# Patient Record
Sex: MALE | Race: BLACK OR AFRICAN AMERICAN | NOT HISPANIC OR LATINO | Employment: UNEMPLOYED | URBAN - METROPOLITAN AREA
[De-identification: names, ages, dates, MRNs, and addresses within clinical notes are randomized per-mention and may not be internally consistent; named-entity substitution may affect disease eponyms.]

---

## 2022-04-08 ENCOUNTER — HOSPITAL ENCOUNTER (EMERGENCY)
Facility: HOSPITAL | Age: 47
Discharge: HOME | End: 2022-04-08
Attending: EMERGENCY MEDICINE
Payer: MEDICAID

## 2022-04-08 VITALS
SYSTOLIC BLOOD PRESSURE: 162 MMHG | OXYGEN SATURATION: 97 % | DIASTOLIC BLOOD PRESSURE: 85 MMHG | HEART RATE: 79 BPM | RESPIRATION RATE: 16 BRPM | TEMPERATURE: 98 F

## 2022-04-08 DIAGNOSIS — R06.02 SOB (SHORTNESS OF BREATH): ICD-10-CM

## 2022-04-08 DIAGNOSIS — J45.901 EXACERBATION OF ASTHMA, UNSPECIFIED ASTHMA SEVERITY, UNSPECIFIED WHETHER PERSISTENT: Primary | ICD-10-CM

## 2022-04-08 PROCEDURE — 25000242 PHARM REV CODE 250 ALT 637 W/ HCPCS: Performed by: STUDENT IN AN ORGANIZED HEALTH CARE EDUCATION/TRAINING PROGRAM

## 2022-04-08 PROCEDURE — 94640 AIRWAY INHALATION TREATMENT: CPT

## 2022-04-08 PROCEDURE — 94761 N-INVAS EAR/PLS OXIMETRY MLT: CPT

## 2022-04-08 PROCEDURE — 99285 EMERGENCY DEPT VISIT HI MDM: CPT | Mod: ,,, | Performed by: EMERGENCY MEDICINE

## 2022-04-08 PROCEDURE — 25000242 PHARM REV CODE 250 ALT 637 W/ HCPCS

## 2022-04-08 PROCEDURE — 93010 ELECTROCARDIOGRAM REPORT: CPT | Mod: ,,, | Performed by: INTERNAL MEDICINE

## 2022-04-08 PROCEDURE — 93010 EKG 12-LEAD: ICD-10-PCS | Mod: ,,, | Performed by: INTERNAL MEDICINE

## 2022-04-08 PROCEDURE — 93005 ELECTROCARDIOGRAM TRACING: CPT

## 2022-04-08 PROCEDURE — 99285 EMERGENCY DEPT VISIT HI MDM: CPT | Mod: 25

## 2022-04-08 PROCEDURE — 99285 PR EMERGENCY DEPT VISIT,LEVEL V: ICD-10-PCS | Mod: ,,, | Performed by: EMERGENCY MEDICINE

## 2022-04-08 RX ORDER — IPRATROPIUM BROMIDE AND ALBUTEROL SULFATE 2.5; .5 MG/3ML; MG/3ML
SOLUTION RESPIRATORY (INHALATION)
Status: COMPLETED
Start: 2022-04-08 | End: 2022-04-08

## 2022-04-08 RX ORDER — PREDNISONE 50 MG/1
50 TABLET ORAL DAILY
Qty: 10 TABLET | Refills: 0 | Status: SHIPPED | OUTPATIENT
Start: 2022-04-08 | End: 2022-04-13

## 2022-04-08 RX ORDER — ALBUTEROL SULFATE 90 UG/1
1-2 AEROSOL, METERED RESPIRATORY (INHALATION) EVERY 6 HOURS PRN
Qty: 8 G | Refills: 0 | Status: SHIPPED | OUTPATIENT
Start: 2022-04-08 | End: 2023-04-08

## 2022-04-08 RX ORDER — IPRATROPIUM BROMIDE AND ALBUTEROL SULFATE 2.5; .5 MG/3ML; MG/3ML
3 SOLUTION RESPIRATORY (INHALATION)
Status: COMPLETED | OUTPATIENT
Start: 2022-04-08 | End: 2022-04-08

## 2022-04-08 RX ADMIN — IPRATROPIUM BROMIDE AND ALBUTEROL SULFATE 3 ML: 2.5; .5 SOLUTION RESPIRATORY (INHALATION) at 08:04

## 2022-04-08 NOTE — ED PROVIDER NOTES
"Encounter Date: 4/8/2022       History     Chief Complaint   Patient presents with    Shortness of Breath     And wheezing, hx of asthma, arrived via ems from work, onset 2 hours PTA, states feels like a typical asthma attack for him, his inhaler was empty     Patient is a 46 year old man with PMH of asthma on intermittent albuterol inhaler presenting for asthma exacerbation.    Patient states he was driving to work approximately two hours ago when he had gradual onset SOB, wheezing, and a feeling of chest tightness. He said this feels similar to his previous asthma exacerbations.  He tried using his albuterol inhaler but he was out of medication.  He denies any chest pain, palpitations, light-headedness, nausea/vomiting, fevers, or leg swelling. Has mild cough productive of white sputum.  EMS gave him 125mg solumedrol and a breathing treatment with improvement of his symptoms.    The history is provided by the patient and medical records. A  was used.          Review of patient's allergies indicates:   Allergen Reactions    Shellfish containing products Swelling     Past Medical History:   Diagnosis Date    Asthma      Past Surgical History:   Procedure Laterality Date    HERNIA REPAIR       History reviewed. No pertinent family history.  Social History     Tobacco Use    Smoking status: Never Smoker    Smokeless tobacco: Never Used   Substance Use Topics    Alcohol use: Yes     Comment: "sometimes"    Drug use: Not Currently     Review of Systems   Constitutional: Negative for fever.   HENT: Negative for congestion and sore throat.    Eyes: Negative for visual disturbance.   Respiratory: Positive for cough, chest tightness, shortness of breath and wheezing.    Cardiovascular: Negative for chest pain, palpitations and leg swelling.   Gastrointestinal: Negative for abdominal pain and nausea.   Musculoskeletal: Negative for back pain.   Skin: Negative for rash.   Neurological: Negative for " weakness, light-headedness and headaches.   Hematological: Does not bruise/bleed easily.   Psychiatric/Behavioral: Negative for confusion.       Physical Exam     Initial Vitals [04/08/22 0816]   BP Pulse Resp Temp SpO2   (!) 194/92 99 20 97.6 °F (36.4 °C) 100 %      MAP       --         Physical Exam    Nursing note and vitals reviewed.  Constitutional: He appears well-developed and well-nourished. He is not diaphoretic. No distress.   Answering full sentences on room air.   HENT:   Head: Normocephalic and atraumatic.   Right Ear: External ear normal.   Left Ear: External ear normal.   Nose: Nose normal.   Mouth/Throat: Oropharynx is clear and moist.   Eyes: Conjunctivae and EOM are normal. Pupils are equal, round, and reactive to light.   Neck: Neck supple.   Normal range of motion.  Cardiovascular: Regular rhythm, normal heart sounds and intact distal pulses. Tachycardia present.  Exam reveals no friction rub.    No murmur heard.  Pulmonary/Chest: Tachypnea (mild) noted. No respiratory distress. He has no decreased breath sounds. He has wheezes. He has no rhonchi. He has no rales.   Abdominal: Abdomen is soft. He exhibits no distension. There is no abdominal tenderness. There is no rebound and no guarding.   Musculoskeletal:         General: No edema.      Cervical back: Normal range of motion and neck supple.     Neurological: He is alert and oriented to person, place, and time. GCS score is 15. GCS eye subscore is 4. GCS verbal subscore is 5. GCS motor subscore is 6.   Skin: Skin is warm and dry. Capillary refill takes less than 2 seconds. No erythema. No pallor.   Psychiatric: He has a normal mood and affect. His behavior is normal. Judgment and thought content normal.         ED Course   Procedures  Labs Reviewed - No data to display  EKG Readings: (Independently Interpreted)   Initial Reading: No STEMI. Previous EKG: Compared with most recent EKG Rhythm: Normal Sinus Rhythm. Heart Rate: 81. Clinical  Impression: Normal Sinus Rhythm   No significant change from prior.     ECG Results          EKG 12-lead (Final result)  Result time 04/08/22 11:01:44    Final result by Interface, Lab In [facility] (04/08/22 11:01:44)                 Narrative:    Test Reason : R06.02,    Vent. Rate : 081 BPM     Atrial Rate : 081 BPM     P-R Int : 200 ms          QRS Dur : 100 ms      QT Int : 374 ms       P-R-T Axes : 062 071 027 degrees     QTc Int : 434 ms    Normal sinus rhythm with sinus arrhythmia  Nonspecific T wave abnormality  Abnormal ECG  When compared with ECG of 10-NOV-2012 18:10,  No significant change was found  Confirmed by Cheyanne Sheets MD ([id number]) on 4/8/2022 11:01:34 AM    Referred By: [email or web address]   SELF           Confirmed By:Cheyanne Sheets MD                            Imaging Results          X-Ray Chest PA And Lateral (Final result)  Result time 04/08/22 10:11:31    Final result by Mina Palmer MD (04/08/22 10:11:31)                 Impression:      No acute abnormality.      Electronically signed by: Mina Palmer MD  Date:    04/08/2022  Time:    10:11             Narrative:    EXAMINATION:  XR CHEST PA AND LATERAL    CLINICAL HISTORY:  Shortness of breath    TECHNIQUE:  PA and lateral views of the chest were performed.    COMPARISON:  11/10/2012    FINDINGS:  The lungs are clear, with normal appearance of pulmonary vasculature and no pleural effusion or pneumothorax.    The cardiac silhouette is normal in size. The hilar and mediastinal contours are unremarkable.    No acute osseous abnormalities.                                 Medications   albuterol-ipratropium 2.5 mg-0.5 mg/3 mL nebulizer solution 3 mL (3 mLs Nebulization Given 4/8/22 0839)     Medical Decision Making:   History:   Old Medical Records: I decided to obtain old medical records.  Initial Assessment:   Patient is a 46 year old male with PMH of asthma presenting with 2 hours of SOB, wheezing, and chest tightness consistent with previous episodes  of asthma exacerbation.  Out of home albuterol. No chest pain, light-headedness, nausea, fevers, or peripheral edema.  Given solumedrol and duoneb with EMS with improvement of his symptoms.  Tachycardic, mildly tachypneic, hypertensive, but satting normally on room air.  Exam with wheezing. Abdomen soft, NT. No edema and pulses equal.  Differential Diagnosis:   Including, but not limited to: Asthma exacerbation, PNA, hypertensive urgency, hypertensive emergency.  Low suspicion for COVID or PE with acute onset of symptoms, improvement with duoneb, and no fevers.  Independently Interpreted Test(s):   I have ordered and independently interpreted EKG Reading(s) - see prior notes  Clinical Tests:   Radiological Study: Ordered  Medical Tests: Ordered  ED Management:  Symptoms consistent with asthma exacerbation. Will give additional doses of duoneb treatments, obtain screening EKG, and CXR.  Patient says he's more comfortable and is in no distress.  Will continue to monitor.            Attending Attestation:   Physician Attestation Statement for Resident:  As the supervising MD   Physician Attestation Statement: I have personally seen and examined this patient.   I agree with the above history. -: 46-year-old male with a history of asthma presenting with wheezing and shortness of breath.  Triggers include cold and hot air, he states there was cold when he when outside this morning.  Ran out of his albuterol inhaler.  No prior intubations.  Received steroids by EMS.   History obtained via .   As the supervising MD I agree with the above PE.   -: End-expiratory wheeze  Speaking in full sentences  Tachycardia  No JVD or stridor  No JVD or stridor   No peripheral edema   As the supervising MD I agree with the above treatment, course, plan, and disposition.   -: No acute chest x-ray findings on my independent interpretation.  EKG with nonspecific T-wave abnormality, no significant change compared to most  recent.  Considered PE in the differential, but no oxygen requirement, improved with treatment for asthma.  Doubt ACS, no chest pain.   Favor asthma exacerbation, steroid prescription sent to [facility].  Advised outpatient PCP follow-up.  Return precautions given.  I have reviewed and agree with the residents interpretation of the following: x-rays and EKG.  I have reviewed the following: old records at this facility.                ED Course as of 04/08/22 1634   Fri Apr 08, 2022   0845 EKG NSR, rate 80, small ST elevation in V3, but this appears chronic in nature from old EKG. No ST depressions or twave inversions. [LA]   1019 Normal chest xray, patient feels improved after duonebs.  Normal oxygen saturations.  At this time he's stable for discharge with Rx for albuterol and PCP information.  All questions answered and strict return precautions given.  Patient states his understanding and is in agreement with plan. [LA]   1047 Prednisone sent to [facility].  Attempted to call patient but no answer.  Pharmacy will call patient when Rx ready. [LA]      ED Course User Index  [LA] Janey Torrez MD             Clinical Impression:   Final diagnoses:  [R06.02] SOB (shortness of breath)  [J45.901] Exacerbation of asthma, unspecified asthma severity, unspecified whether persistent (Primary)          ED Disposition Condition    Discharge Stable        ED Prescriptions     Medication Sig Dispense Start Date End Date Auth. Provider    albuterol (PROVENTIL/VENTOLIN HFA) 90 mcg/actuation inhaler Inhale 1-2 puffs into the lungs every 6 (six) hours as needed for Wheezing. Rescue 8 g 4/8/2022 4/8/2023 Janey Torrez MD    predniSONE (DELTASONE) 50 MG Tab Take 1 tablet (50 mg total) by mouth once daily. for 5 days 10 tablet 4/8/2022 4/18/2022 Иван Driver MD        Follow-up Information     Follow up With Specialties Details Why Contact Info Additional Information    [facility] Emergency  Medicine  As needed, If symptoms worsen [address]  [facility] [id number] [phone]     [facility] Internal Medicine In 1 week  [address]  [facility] [address] [phone] [facility] Please park in surface lot and check in at central registration desk           Janey Torrez MD  Resident  04/08/22 1025       Иван Driver MD  04/08/22 4293

## 2022-04-08 NOTE — Clinical Note
"Cali Glasgow (Roberto)jia was seen and treated in our emergency department on 4/8/2022.  He may return to work on 04/09/2022.       If you have any questions or concerns, please don't hesitate to call.       RN    "

## 2023-03-16 ENCOUNTER — HOSPITAL ENCOUNTER (EMERGENCY)
Facility: HOSPITAL | Age: 48
Discharge: HOME | End: 2023-03-16
Attending: EMERGENCY MEDICINE
Payer: MEDICAID

## 2023-03-16 VITALS
BODY MASS INDEX: 43.71 KG/M2 | DIASTOLIC BLOOD PRESSURE: 92 MMHG | HEIGHT: 66 IN | TEMPERATURE: 98 F | WEIGHT: 272 LBS | OXYGEN SATURATION: 96 % | HEART RATE: 87 BPM | SYSTOLIC BLOOD PRESSURE: 153 MMHG | RESPIRATION RATE: 20 BRPM

## 2023-03-16 DIAGNOSIS — R06.82 TACHYPNEA: ICD-10-CM

## 2023-03-16 DIAGNOSIS — R94.31 ABNORMAL FINDING ON EKG: ICD-10-CM

## 2023-03-16 DIAGNOSIS — J45.901 MILD ASTHMA WITH EXACERBATION, UNSPECIFIED WHETHER PERSISTENT: Primary | ICD-10-CM

## 2023-03-16 DIAGNOSIS — R06.02 SOB (SHORTNESS OF BREATH): ICD-10-CM

## 2023-03-16 LAB
ALBUMIN SERPL BCP-MCNC: 4.1 G/DL (ref 3.5–5.2)
ALLENS TEST: ABNORMAL
ALP SERPL-CCNC: 103 U/L (ref 55–135)
ALT SERPL W/O P-5'-P-CCNC: 16 U/L (ref 10–44)
ANION GAP SERPL CALC-SCNC: 8 MMOL/L (ref 8–16)
AST SERPL-CCNC: 14 U/L (ref 10–40)
BASOPHILS # BLD AUTO: 0.01 K/UL (ref 0–0.2)
BASOPHILS NFR BLD: 0.2 % (ref 0–1.9)
BILIRUB SERPL-MCNC: 1.1 MG/DL (ref 0.1–1)
BNP SERPL-MCNC: <10 PG/ML (ref 0–99)
BUN SERPL-MCNC: 9 MG/DL (ref 6–20)
CALCIUM SERPL-MCNC: 9.5 MG/DL (ref 8.7–10.5)
CHLORIDE SERPL-SCNC: 106 MMOL/L (ref 95–110)
CO2 SERPL-SCNC: 26 MMOL/L (ref 23–29)
CREAT SERPL-MCNC: 0.9 MG/DL (ref 0.5–1.4)
DELSYS: ABNORMAL
DIFFERENTIAL METHOD: ABNORMAL
EOSINOPHIL # BLD AUTO: 0.2 K/UL (ref 0–0.5)
EOSINOPHIL NFR BLD: 3.3 % (ref 0–8)
ERYTHROCYTE [DISTWIDTH] IN BLOOD BY AUTOMATED COUNT: 12.9 % (ref 11.5–14.5)
EST. GFR  (NO RACE VARIABLE): >60 ML/MIN/1.73 M^2
GLUCOSE SERPL-MCNC: 155 MG/DL (ref 70–110)
HCO3 UR-SCNC: 26.8 MMOL/L (ref 24–28)
HCT VFR BLD AUTO: 36.1 % (ref 40–54)
HCV AB SERPL QL IA: NORMAL
HGB BLD-MCNC: 12.6 G/DL (ref 14–18)
HIV 1+2 AB+HIV1 P24 AG SERPL QL IA: NORMAL
IMM GRANULOCYTES # BLD AUTO: 0.02 K/UL (ref 0–0.04)
IMM GRANULOCYTES NFR BLD AUTO: 0.4 % (ref 0–0.5)
INFLUENZA A, MOLECULAR: NOT DETECTED
INFLUENZA B, MOLECULAR: NOT DETECTED
LYMPHOCYTES # BLD AUTO: 2 K/UL (ref 1–4.8)
LYMPHOCYTES NFR BLD: 40.9 % (ref 18–48)
MCH RBC QN AUTO: 29.4 PG (ref 27–31)
MCHC RBC AUTO-ENTMCNC: 34.9 G/DL (ref 32–36)
MCV RBC AUTO: 84 FL (ref 82–98)
MODE: ABNORMAL
MONOCYTES # BLD AUTO: 0.3 K/UL (ref 0.3–1)
MONOCYTES NFR BLD: 5.8 % (ref 4–15)
NEUTROPHILS # BLD AUTO: 2.4 K/UL (ref 1.8–7.7)
NEUTROPHILS NFR BLD: 49.4 % (ref 38–73)
NRBC BLD-RTO: 0 /100 WBC
PCO2 BLDA: 39.5 MMHG (ref 35–45)
PH SMN: 7.44 [PH] (ref 7.35–7.45)
PLATELET # BLD AUTO: 217 K/UL (ref 150–450)
PMV BLD AUTO: 9.6 FL (ref 9.2–12.9)
PO2 BLDA: 44 MMHG (ref 40–60)
POC BE: 3 MMOL/L
POC SATURATED O2: 82 % (ref 95–100)
POC TCO2: 28 MMOL/L (ref 24–29)
POTASSIUM SERPL-SCNC: 3.7 MMOL/L (ref 3.5–5.1)
PROT SERPL-MCNC: 7.5 G/DL (ref 6–8.4)
RBC # BLD AUTO: 4.29 M/UL (ref 4.6–6.2)
RSV AG BY MOLECULAR METHOD: NOT DETECTED
SAMPLE: ABNORMAL
SARS-COV-2 RNA RESP QL NAA+PROBE: NOT DETECTED
SITE: ABNORMAL
SODIUM SERPL-SCNC: 140 MMOL/L (ref 136–145)
TROPONIN I SERPL DL<=0.01 NG/ML-MCNC: <0.006 NG/ML (ref 0–0.03)
TROPONIN I SERPL DL<=0.01 NG/ML-MCNC: <0.006 NG/ML (ref 0–0.03)
WBC # BLD AUTO: 4.82 K/UL (ref 3.9–12.7)

## 2023-03-16 PROCEDURE — 99285 EMERGENCY DEPT VISIT HI MDM: CPT | Mod: 25

## 2023-03-16 PROCEDURE — 82803 BLOOD GASES ANY COMBINATION: CPT

## 2023-03-16 PROCEDURE — 99900035 HC TECH TIME PER 15 MIN (STAT)

## 2023-03-16 PROCEDURE — 25000242 PHARM REV CODE 250 ALT 637 W/ HCPCS: Performed by: STUDENT IN AN ORGANIZED HEALTH CARE EDUCATION/TRAINING PROGRAM

## 2023-03-16 PROCEDURE — 86803 HEPATITIS C AB TEST: CPT | Performed by: PHYSICIAN ASSISTANT

## 2023-03-16 PROCEDURE — 63600175 PHARM REV CODE 636 W HCPCS: Performed by: STUDENT IN AN ORGANIZED HEALTH CARE EDUCATION/TRAINING PROGRAM

## 2023-03-16 PROCEDURE — 85025 COMPLETE CBC W/AUTO DIFF WBC: CPT | Performed by: STUDENT IN AN ORGANIZED HEALTH CARE EDUCATION/TRAINING PROGRAM

## 2023-03-16 PROCEDURE — 84484 ASSAY OF TROPONIN QUANT: CPT | Performed by: STUDENT IN AN ORGANIZED HEALTH CARE EDUCATION/TRAINING PROGRAM

## 2023-03-16 PROCEDURE — 96374 THER/PROPH/DIAG INJ IV PUSH: CPT

## 2023-03-16 PROCEDURE — 93010 ELECTROCARDIOGRAM REPORT: CPT | Mod: ,,, | Performed by: INTERNAL MEDICINE

## 2023-03-16 PROCEDURE — 93010 EKG 12-LEAD: ICD-10-PCS | Mod: ,,, | Performed by: INTERNAL MEDICINE

## 2023-03-16 PROCEDURE — 27100098 HC SPACER

## 2023-03-16 PROCEDURE — 83880 ASSAY OF NATRIURETIC PEPTIDE: CPT | Performed by: STUDENT IN AN ORGANIZED HEALTH CARE EDUCATION/TRAINING PROGRAM

## 2023-03-16 PROCEDURE — 99285 PR EMERGENCY DEPT VISIT,LEVEL V: ICD-10-PCS | Mod: CS,,, | Performed by: EMERGENCY MEDICINE

## 2023-03-16 PROCEDURE — 0241U SARS-COV2 (COVID) WITH FLU/RSV BY PCR: CPT | Performed by: STUDENT IN AN ORGANIZED HEALTH CARE EDUCATION/TRAINING PROGRAM

## 2023-03-16 PROCEDURE — 80053 COMPREHEN METABOLIC PANEL: CPT | Performed by: STUDENT IN AN ORGANIZED HEALTH CARE EDUCATION/TRAINING PROGRAM

## 2023-03-16 PROCEDURE — 93005 ELECTROCARDIOGRAM TRACING: CPT

## 2023-03-16 PROCEDURE — 99285 EMERGENCY DEPT VISIT HI MDM: CPT | Mod: CS,,, | Performed by: EMERGENCY MEDICINE

## 2023-03-16 PROCEDURE — 94640 AIRWAY INHALATION TREATMENT: CPT

## 2023-03-16 PROCEDURE — 87389 HIV-1 AG W/HIV-1&-2 AB AG IA: CPT | Performed by: PHYSICIAN ASSISTANT

## 2023-03-16 RX ORDER — ALBUTEROL SULFATE 0.63 MG/3ML
0.63 SOLUTION RESPIRATORY (INHALATION) EVERY 6 HOURS PRN
Qty: 75 ML | Refills: 0 | Status: SHIPPED | OUTPATIENT
Start: 2023-03-16 | End: 2023-07-11 | Stop reason: SDUPTHER

## 2023-03-16 RX ORDER — METHYLPREDNISOLONE SOD SUCC 125 MG
125 VIAL (EA) INJECTION
Status: COMPLETED | OUTPATIENT
Start: 2023-03-16 | End: 2023-03-16

## 2023-03-16 RX ORDER — IPRATROPIUM BROMIDE AND ALBUTEROL SULFATE 2.5; .5 MG/3ML; MG/3ML
3 SOLUTION RESPIRATORY (INHALATION)
Status: COMPLETED | OUTPATIENT
Start: 2023-03-16 | End: 2023-03-16

## 2023-03-16 RX ORDER — PREDNISONE 20 MG/1
60 TABLET ORAL DAILY
Qty: 15 TABLET | Refills: 0 | Status: SHIPPED | OUTPATIENT
Start: 2023-03-16 | End: 2023-03-21

## 2023-03-16 RX ORDER — ALBUTEROL SULFATE 90 UG/1
4 AEROSOL, METERED RESPIRATORY (INHALATION)
Status: COMPLETED | OUTPATIENT
Start: 2023-03-16 | End: 2023-03-16

## 2023-03-16 RX ADMIN — IPRATROPIUM BROMIDE AND ALBUTEROL SULFATE 3 ML: 2.5; .5 SOLUTION RESPIRATORY (INHALATION) at 08:03

## 2023-03-16 RX ADMIN — METHYLPREDNISOLONE SODIUM SUCCINATE 125 MG: 125 INJECTION, POWDER, FOR SOLUTION INTRAMUSCULAR; INTRAVENOUS at 08:03

## 2023-03-16 RX ADMIN — ALBUTEROL SULFATE 4 PUFF: 108 INHALANT RESPIRATORY (INHALATION) at 10:03

## 2023-03-16 NOTE — ED PROVIDER NOTES
"Encounter Date: 3/16/2023       History     Chief Complaint   Patient presents with    Shortness of Breath     Patient with SOB, audible wheezes x 2 hours, Difficulty speaking due to SOB     This is a 47-year-old male with a PMHx of asthma who presents with SOB and wheezing that started this morning. He tried taking his albuterol inhaler once, and it did not relieve his symptoms. Had some sore throat, runny nose, and cough for the past two days. He normally has controlled asthma and only has exacerbations that make him visit the hospital 1-2 times per year. Last was one year ago. No fevers, chills, abdominal pain, nausea, vomiting, or any other complaints. Denies any other medical history. Denies tobacco use and recreational drug use. Reports social alcohol use.     The history is provided by the patient and medical records. A  was used.   Review of patient's allergies indicates:   Allergen Reactions    Shellfish containing products Swelling     Past Medical History:   Diagnosis Date    Asthma      Past Surgical History:   Procedure Laterality Date    HERNIA REPAIR       No family history on file.  Social History     Tobacco Use    Smoking status: Never    Smokeless tobacco: Never   Substance Use Topics    Alcohol use: Yes     Comment: "sometimes"    Drug use: Not Currently     Review of Systems   Respiratory:  Positive for shortness of breath and wheezing.      Physical Exam     Initial Vitals [03/16/23 0839]   BP Pulse Resp Temp SpO2   (!) 144/86 106 (!) 26 98.3 °F (36.8 °C) 98 %      MAP       --         Physical Exam    Nursing note and vitals reviewed.  Constitutional: He appears well-developed and well-nourished. He is not diaphoretic.   HENT:   Head: Normocephalic and atraumatic.   Eyes: Conjunctivae and EOM are normal. Pupils are equal, round, and reactive to light.   Neck: Neck supple.   Normal range of motion.  Cardiovascular:  Regular rhythm, normal heart sounds and intact " distal pulses.     Exam reveals no gallop and no friction rub.       No murmur heard.  Tachycardic   Pulmonary/Chest: He has wheezes. He has no rhonchi. He has no rales.   Increased work of breathing and tachypnea.  Diffuse wheezes bilaterally   Abdominal: Abdomen is soft. He exhibits no distension. There is no abdominal tenderness. There is no rebound and no guarding.   Musculoskeletal:         General: No tenderness or edema.      Cervical back: Normal range of motion and neck supple.      Comments: No calf pain, swelling, tenderness b/l     Neurological: He is alert and oriented to person, place, and time.   Skin: Skin is warm and dry. Capillary refill takes less than 2 seconds.       ED Course   Procedures  Labs Reviewed   CBC W/ AUTO DIFFERENTIAL - Abnormal; Notable for the following components:       Result Value    RBC 4.29 (*)     Hemoglobin 12.6 (*)     Hematocrit 36.1 (*)     All other components within normal limits   COMPREHENSIVE METABOLIC PANEL - Abnormal; Notable for the following components:    Glucose 155 (*)     Total Bilirubin 1.1 (*)     All other components within normal limits   ISTAT PROCEDURE - Abnormal; Notable for the following components:    POC SATURATED O2 82 (*)     All other components within normal limits   HIV 1 / 2 ANTIBODY    Narrative:     Release to patient->Immediate   HEPATITIS C ANTIBODY    Narrative:     Release to patient->Immediate   SARS-COV2 (COVID) WITH FLU/RSV BY PCR   TROPONIN I   B-TYPE NATRIURETIC PEPTIDE   TROPONIN I   POCT TROPONIN   POCT TROPONIN     EKG Readings: (Independently Interpreted)   Initial Reading: No STEMI. Previous EKG: Compared with most recent EKG Rhythm: Normal Sinus Rhythm. Heart Rate: 93. Clinical Impression: Normal Sinus Rhythm   LVH  TWI inferior, laterally      ECG Results              EKG 12-lead (Final result)  Result time 03/16/23 09:33:57      Final result by Interface, Lab In [facility] (03/16/23 09:33:57)                   Narrative:     Test Reason : R06.02,    Vent. Rate : 093 BPM     Atrial Rate : 093 BPM     P-R Int : 180 ms          QRS Dur : 096 ms      QT Int : 376 ms       P-R-T Axes : 039 102 -48 degrees     QTc Int : 467 ms    Normal sinus rhythm  LVH with repolarization abnormality  Possible infero-lateral MI  Abnormal ECG  When compared with ECG of 08-APR-2022 08:39,  Significant changes have occurred  Inferolateral Qs still narrow but deeper  Confirmed by Moreno ARITA MD ([id number]) on 3/16/2023 9:33:49 AM    Referred By: MESSIERR   SELF           Confirmed By:Moreno ARITA MD                                  Imaging Results              X-Ray Chest AP Portable (Final result)  Result time 03/16/23 10:44:48      Final result by Byron Luna MD (03/16/23 10:44:48)                   Impression:      No significant intrathoracic abnormality directly referable to the current history of shortness of breath is appreciated.  Allowing for differences in projection, there has been no significant detrimental interval change in the appearance of the chest since 04/08/2022.      Electronically signed by: Byron Luna MD  Date:    03/16/2023  Time:    10:44               Narrative:    EXAMINATION:  XR CHEST AP PORTABLE    CLINICAL HISTORY:  Chest Pain;    TECHNIQUE:  One view    COMPARISON:  Comparison is made to 04/08/2022.  Clinical information of dyspnea.    FINDINGS:  Allowing for magnification of the cardiomediastinal silhouette related to projection, the heart size is within normal limits, and there has been no detrimental change in the appearance of the cardiomediastinal silhouette or pulmonary vascularity since the examination referenced above. Lung zones also appear stable and are essentially clear, free of significant airspace consolidation or volume loss. No pleural fluid of any substantial volume is seen on either side. No pneumothorax or pneumomediastinum.                                       Medications   albuterol-ipratropium 2.5 mg-0.5 mg/3 mL  nebulizer solution 3 mL (3 mLs Nebulization Given 3/16/23 7537)   methylPREDNISolone sodium succinate injection 125 mg (125 mg Intravenous Given 3/16/23 7832)   albuterol inhaler 4 puff (4 puffs Inhalation Given 3/16/23 4188)     Medical Decision Making:   History:   Old Medical Records: I decided to obtain old medical records.  Initial Assessment:   Tachycardic and tachypneic 47-year-old male with PMH of asthma presents with shortness of breath since this morning and diffuse wheezes  Differential Diagnosis:   Including, but not limited to: Asthma exacerbation, viral URI, pneumonia, ACS, CHF, doubt PE  Doubt obi/myocarditis - normal biomarkers, improved with tx for asthma, no chest pain   Independently Interpreted Test(s):   I have ordered and independently interpreted X-rays - see summary below.       <> Summary of X-Ray Reading(s): No acute changes   I have ordered and independently interpreted EKG Reading(s) - see prior notes  Clinical Tests:   Lab Tests: Ordered and Reviewed  Radiological Study: Ordered and Reviewed  Medical Tests: Ordered and Reviewed  ED Management:  Will initiate treatment with DuoNebs x3 and Solu-Medrol.  See ED course for additional information.          Attending Attestation:   Physician Attestation Statement for Resident:  As the supervising MD   Physician Attestation Statement: I have personally seen and examined this patient.   I agree with the above history.  -: Patient woke up this morning and began experiencing shortness of breath.  Asthma triggers include: sprays, cold air.  Feels similar to previous asthma exacerbations.   URI sx - congestion.  Denies tobacco use.  Denies chest pain.     :  Gustavo #[id number].    As the supervising MD I agree with the above PE.   -: No conversational dyspnea  Expiratory wheezes, diffuse   RRR, no JVD, no stridor, no murmur   No lower extremity edema or calf tenderness    As the supervising MD I agree with the above treatment, course,  plan, and disposition.   -: Favor acute asthma exacerbation, possibly from cold air or URI sx.   Viral swabs negative, no oxygen requirement.   Considered PE, but no hypoxia, improved with tx for asthma.  EKG with new TWI, inferior TWI noted on prior EKGs.   Lower suspicion for ACS, but will perform delta CTNI.  CTNI neg x 2, no chest pain, doubt ACS, low risk heart score.  Wheezing significantly improved.   Advised PCP f/u this week for repeat BP check, arrange outpatient provocative cardiac testing/ECHO, repeat EKG, as indicated.   Return to ED if chest pain develops or any new/concerning sx.     I have reviewed and agree with the residents interpretation of the following: lab data, x-rays and EKG.  I have reviewed the following: old records at this facility.              ED Course as of 03/16/23 1346   Thu Mar 16, 2023   0917 CBC auto differential(!)  CBC unremarkable without leukocytosis, significant anemia, or decreased platelets   [BD]   0917 ISTAT PROCEDURE(!)  VBG unremarkable with normal pH, pCO2, and HCO3 [BD]   0938 WBC: 4.82  No leukocytosis  [AB]   0938 POC PCO2: 39.5 [AB]   0938 Site: Other [AB]   0945 EKG 12-lead  EKG independently interpreted by me shows Normal sinus rhythm at a rate of 93, no STEMI, high voltage diffusely, consistent with LVH.  Flipped T-waves inferiorly and laterally [BD]   0949 Troponin I: <0.006  Initial troponin negative.  Patient has some EKG changes so we will repeat after 3 hours [BD]   0950 BNP: <10  CHF unlikely [BD]   1002 Comprehensive metabolic panel(!)  CMP unremarkable without significant electrolyte derangement, impaired renal function, or elevated LFTs   [BD]   1031 SARS-Cov2 (COVID) with FLU/RSV by PCR  Negative for covid, flu, and RSV [BD]   1112 X-Ray Chest AP Portable  Chest x-ray independently interpreted by me shows no acute process such as pneumonia, pneumothorax, or pulmonary edema.    [BD]   1112 Pt improved greatly after the initial duonebs and has been  resting comfortably in bed since then. Will give an MDI treatment to ensure patient has an inhaler. Repeat trop pending.  [BD]      ED Course User Index  [AB] Иван Driver MD  [BD] Shant Mackey MD                   Clinical Impression:   Final diagnoses:  [R06.02] SOB (shortness of breath)  [J45.901] Mild asthma with exacerbation, unspecified whether persistent (Primary)  [R06.82] Tachypnea  [R94.31] Abnormal finding on EKG        ED Disposition Condition    Discharge Stable          ED Prescriptions       Medication Sig Dispense Start Date End Date Auth. Provider    predniSONE (DELTASONE) 20 MG tablet Take 3 tablets (60 mg total) by mouth once daily. for 5 days 15 tablet 3/16/2023 3/21/2023 Shant Mackey MD    albuterol (ACCUNEB) 0.63 mg/3 mL Nebu Take 3 mLs (0.63 mg total) by nebulization every 6 (six) hours as needed. 75 mL 3/16/2023 -- Shant Mackey MD          Follow-up Information       Follow up With Specialties Details Why Contact [facility] - Cardiology Cardiology, Cardiovascular Disease, Cardiac Rehabilitation, Cardiothoracic Surgery   [address] [facility] [address]  [phone]      [facility] - Emergency Dept Emergency Medicine Go to  As needed, If symptoms worsen [address] [facility] [id number] [phone]             Shant Mackey MD  Resident  03/16/23 1346              Иван Driver MD  03/16/23 1756       Иван Driver MD  03/17/23 2182

## 2023-03-16 NOTE — PLAN OF CARE
JAYSON faxed referral to [facility] Cardiology to [phone]      Juani Powell CD, MSW, LMSW, RSW   Case Management  [facility]  Email: [email or web address]

## 2023-03-16 NOTE — ED NOTES
Resident gave pt discharge instructions. IV removed cath intact. Pt discharged with albuterol and spacer.

## 2023-03-16 NOTE — DISCHARGE INSTRUCTIONS
"You were seen in the emergency department for difficulty breathing related to asthma ("asthma attack" or "asthma exacerbation").    Diagnosis: asthma exacerbation    Return to the emergency department if you have signs of severe difficulty breathing including:   - Breathing too fast  - Not able to say more than 2-3 words at a time without taking a breath  - Blue-marco antonio or gray/dusky color of the face, lips or fingers  - Muscles pulling in around the neck or ribs    Return to the emergency department at any time if you think that you are getting worse.    Tests today showed:   Labs Reviewed   CBC W/ AUTO DIFFERENTIAL - Abnormal; Notable for the following components:       Result Value    RBC 4.29 (*)     Hemoglobin 12.6 (*)     Hematocrit 36.1 (*)     All other components within normal limits   COMPREHENSIVE METABOLIC PANEL - Abnormal; Notable for the following components:    Glucose 155 (*)     Total Bilirubin 1.1 (*)     All other components within normal limits   ISTAT PROCEDURE - Abnormal; Notable for the following components:    POC SATURATED O2 82 (*)     All other components within normal limits   HIV 1 / 2 ANTIBODY    Narrative:     Release to patient->Immediate   HEPATITIS C ANTIBODY    Narrative:     Release to patient->Immediate   SARS-COV2 (COVID) WITH FLU/RSV BY PCR   TROPONIN I   B-TYPE NATRIURETIC PEPTIDE   TROPONIN I   POCT TROPONIN   POCT TROPONIN     Imaging Results              X-Ray Chest AP Portable (Final result)  Result time 03/16/23 10:44:48      Final result by Byron Luna MD (03/16/23 10:44:48)                   Impression:      No significant intrathoracic abnormality directly referable to the current history of shortness of breath is appreciated.  Allowing for differences in projection, there has been no significant detrimental interval change in the appearance of the chest since 04/08/2022.      Electronically signed by: Byron Luna MD  Date:    03/16/2023  Time:    10:44               " Narrative:    EXAMINATION:  XR CHEST AP PORTABLE    CLINICAL HISTORY:  Chest Pain;    TECHNIQUE:  One view    COMPARISON:  Comparison is made to 04/08/2022.  Clinical information of dyspnea.    FINDINGS:  Allowing for magnification of the cardiomediastinal silhouette related to projection, the heart size is within normal limits, and there has been no detrimental change in the appearance of the cardiomediastinal silhouette or pulmonary vascularity since the examination referenced above. Lung zones also appear stable and are essentially clear, free of significant airspace consolidation or volume loss. No pleural fluid of any substantial volume is seen on either side. No pneumothorax or pneumomediastinum.                                      Treatments you had today:   Medications   albuterol-ipratropium 2.5 mg-0.5 mg/3 mL nebulizer solution 3 mL (3 mLs Nebulization Given 3/16/23 0858)   methylPREDNISolone sodium succinate injection 125 mg (125 mg Intravenous Given 3/16/23 0855)   albuterol inhaler 4 puff (4 puffs Inhalation Given 3/16/23 1059)       Home Care Instructions:  - Take albuterol (inhaler 4 puffs or nebulizer) every 4 hours as needed for wheezing or difficulty breathing  - Take the prescribed steroid as directed  - Continue taking other home medications as previously prescribed    Follow-Up Plan:  - Follow-up with: Primary care doctor within 2 - 3 days  - Additional outpatient testing and/or evaluation as directed by your doctor  - You need to follow up with Cardiology at [facility].  Their number is below and I have placed a referral.

## 2023-03-17 ENCOUNTER — TELEPHONE (OUTPATIENT)
Dept: EMERGENCY MEDICINE | Facility: HOSPITAL | Age: 48
End: 2023-03-17
Payer: MEDICAID

## 2023-03-17 NOTE — TELEPHONE ENCOUNTER
Attempted to reach patient for ED follow up via  [facility] (ID [id number]).  Unable to reach patient on multiple attempts.  Planned to discuss how he is feeling and if he has been able to schedule f/u with Cardiologist (information provided in discharge paperwork).

## 2023-07-11 ENCOUNTER — HOSPITAL ENCOUNTER (EMERGENCY)
Facility: HOSPITAL | Age: 48
Discharge: HOME | End: 2023-07-11
Attending: EMERGENCY MEDICINE
Payer: MEDICAID

## 2023-07-11 VITALS
RESPIRATION RATE: 20 BRPM | HEART RATE: 83 BPM | DIASTOLIC BLOOD PRESSURE: 87 MMHG | TEMPERATURE: 99 F | SYSTOLIC BLOOD PRESSURE: 153 MMHG | OXYGEN SATURATION: 96 %

## 2023-07-11 DIAGNOSIS — I10 HYPERTENSION, UNSPECIFIED TYPE: ICD-10-CM

## 2023-07-11 DIAGNOSIS — J45.909 ASTHMA, UNSPECIFIED ASTHMA SEVERITY, UNSPECIFIED WHETHER COMPLICATED, UNSPECIFIED WHETHER PERSISTENT: Primary | ICD-10-CM

## 2023-07-11 PROCEDURE — 99284 EMERGENCY DEPT VISIT MOD MDM: CPT

## 2023-07-11 RX ORDER — ALBUTEROL SULFATE 90 UG/1
1-2 AEROSOL, METERED RESPIRATORY (INHALATION) EVERY 6 HOURS PRN
Qty: 18 G | Refills: 2 | Status: SHIPPED | OUTPATIENT
Start: 2023-07-11 | End: 2024-07-10

## 2023-07-11 RX ORDER — ALBUTEROL SULFATE 0.63 MG/3ML
0.63 SOLUTION RESPIRATORY (INHALATION) EVERY 6 HOURS PRN
Qty: 75 ML | Refills: 0 | Status: SHIPPED | OUTPATIENT
Start: 2023-07-11

## 2023-07-11 RX ORDER — ALBUTEROL SULFATE 90 UG/1
2 AEROSOL, METERED RESPIRATORY (INHALATION) EVERY 6 HOURS PRN
Status: DISCONTINUED | OUTPATIENT
Start: 2023-07-11 | End: 2023-07-11 | Stop reason: HOSPADM

## 2023-07-11 NOTE — ED PROVIDER NOTES
"Encounter Date: 7/11/2023       History     Chief Complaint   Patient presents with    Shortness of Breath     Out of asthma inhaler.      Cali Smith is 47-year-old male past medical history of asthma presenting today for shortness of breath.  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  He had wheezing, used albuterol inhaler with improvement.  He did notice that his inhaler is out and needs a refill.  He denies cough, fever or chills.  Denies chest pain.  Symptoms currently resolved.     used for interpretation    Review of patient's allergies indicates:   Allergen Reactions    Shellfish containing products Swelling     Past Medical History:   Diagnosis Date    Asthma      Past Surgical History:   Procedure Laterality Date    HERNIA REPAIR       No family history on file.  Social History     Tobacco Use    Smoking status: Never    Smokeless tobacco: Never   Substance Use Topics    Alcohol use: Yes     Comment: "sometimes"    Drug use: Not Currently     Review of Systems    Physical Exam     Initial Vitals [07/11/23 1428]   BP Pulse Resp Temp SpO2   (!) 153/87 83 20 98.6 °F (37 °C) 96 %      MAP       --         Physical Exam    Vitals reviewed.  Constitutional: He appears well-developed and well-nourished. No distress.   HENT:   Mouth/Throat: Oropharynx is clear and moist.   Eyes: Conjunctivae are normal.   Cardiovascular:  Normal rate and regular rhythm.           Pulmonary/Chest: No respiratory distress. He has no wheezes. He has no rales.   Abdominal: Abdomen is soft. There is no abdominal tenderness.   Musculoskeletal:         General: No edema.     Neurological: He is alert and oriented to person, place, and time.       ED Course   Procedures  Labs Reviewed - No data to display       Imaging Results    None          Medications   albuterol inhaler 2 puff (has no administration in time range)     Medical Decision Making: "   History:   Old Medical Records: I decided to obtain old medical records.  Initial Assessment:   Urgent evaluation a 47-year-old male presenting today for medication refill, history of asthma used his pump today and is now out..    Vital signs reviewed, he is afebrile with hypoxemia.  Hypertension noted  Differential Diagnosis:   Asthma exacerbation, reactive airway disease, less likely pneumonia  ED Management:  - vitals stable, sx resolved  - no indication for imaging or labs at this time  - dispense albuterol inhaler now, give prescription.   - follow up with PCP regarding BP check                        Clinical Impression:   Final diagnoses:  [J45.909] Asthma, unspecified asthma severity, unspecified whether complicated, unspecified whether persistent (Primary)  [I10] Hypertension, unspecified type        ED Disposition Condition    Discharge Stable          ED Prescriptions    None       Follow-up Information       Follow up With Specialties Details Why Contact Info    [facility] Emergency Medicine  If symptoms worsen [id number]  [facility] [id number]  [phone]             Ivone Mcdonald MD  07/11/23 4503

## 2023-07-11 NOTE — DISCHARGE INSTRUCTIONS
He colocado kobe derivación a [facility] para que realice un seguimiento con respecto a peacock asma y presión arterial gely. Necesita que le revisen la presión arterial nuevamente. Mientras tanto, dieta baja en shirley y ejercicio.

## 2023-07-11 NOTE — ED TRIAGE NOTES
Pt is a 47 yr old male arrived to er presenting with request for asthma inhaler, states he is out of asthma pump. Pt denies SOB, chest pain, abd pain, n/v/fever. Pt states he is only here for refill of asthma medication.